# Patient Record
Sex: MALE | Employment: FULL TIME | URBAN - METROPOLITAN AREA
[De-identification: names, ages, dates, MRNs, and addresses within clinical notes are randomized per-mention and may not be internally consistent; named-entity substitution may affect disease eponyms.]

---

## 2019-11-20 ENCOUNTER — OFFICE VISIT (OUTPATIENT)
Dept: FAMILY MEDICINE | Facility: CLINIC | Age: 39
End: 2019-11-20
Payer: COMMERCIAL

## 2019-11-20 VITALS
SYSTOLIC BLOOD PRESSURE: 131 MMHG | BODY MASS INDEX: 27.97 KG/M2 | TEMPERATURE: 97.5 F | WEIGHT: 174 LBS | HEART RATE: 62 BPM | OXYGEN SATURATION: 98 % | DIASTOLIC BLOOD PRESSURE: 84 MMHG | HEIGHT: 66 IN

## 2019-11-20 DIAGNOSIS — R31.0 GROSS HEMATURIA: Primary | ICD-10-CM

## 2019-11-20 LAB
ALBUMIN UR-MCNC: NEGATIVE MG/DL
APPEARANCE UR: CLEAR
BACTERIA #/AREA URNS HPF: ABNORMAL /HPF
BILIRUB UR QL STRIP: NEGATIVE
COLOR UR AUTO: YELLOW
GLUCOSE UR STRIP-MCNC: NEGATIVE MG/DL
HGB UR QL STRIP: NEGATIVE
KETONES UR STRIP-MCNC: NEGATIVE MG/DL
LEUKOCYTE ESTERASE UR QL STRIP: NEGATIVE
NITRATE UR QL: NEGATIVE
NON-SQ EPI CELLS #/AREA URNS LPF: ABNORMAL /LPF
PH UR STRIP: 7 PH (ref 5–7)
RBC #/AREA URNS AUTO: ABNORMAL /HPF
SOURCE: ABNORMAL
SP GR UR STRIP: 1.01 (ref 1–1.03)
UROBILINOGEN UR STRIP-ACNC: 0.2 EU/DL (ref 0.2–1)
WBC #/AREA URNS AUTO: ABNORMAL /HPF

## 2019-11-20 PROCEDURE — 00000103 ZZHCL STATISTIC CYTO-FISH QC 88120: Performed by: FAMILY MEDICINE

## 2019-11-20 PROCEDURE — 88112 CYTOPATH CELL ENHANCE TECH: CPT | Performed by: FAMILY MEDICINE

## 2019-11-20 PROCEDURE — 99203 OFFICE O/P NEW LOW 30 MIN: CPT | Performed by: FAMILY MEDICINE

## 2019-11-20 PROCEDURE — 81001 URINALYSIS AUTO W/SCOPE: CPT | Performed by: FAMILY MEDICINE

## 2019-11-20 RX ORDER — MELOXICAM 7.5 MG/1
7.5 TABLET ORAL
COMMUNITY
Start: 2019-11-05

## 2019-11-20 ASSESSMENT — PAIN SCALES - GENERAL: PAINLEVEL: NO PAIN (0)

## 2019-11-20 ASSESSMENT — MIFFLIN-ST. JEOR: SCORE: 1652.01

## 2019-11-20 NOTE — PATIENT INSTRUCTIONS
At [facility], we strive to deliver an exceptional experience to you, every time we see you. If you receive a survey, please complete it as we do value your feedback.  If you have MyChart, you can expect to receive results automatically within 24 hours of their completion.  Your provider will send a note interpreting your results as well.   If you do not have MyChart, you should receive your results in about a week by mail.    Your care team:                            Family Medicine Internal Medicine   MD Mckinley Simon MD Shantel Branch-Fleming, MD Katya Georgiev PA-C Megan Hill, APRMILLA Linda, MD Pediatrics   Juan A Serrano, PANnekaC  Amber Jacques, MD Estela Marx APRN CNP   MD Desi Hopper MD Deborah Mielke, MD Kim Thein, APRN CNP      Clinic hours: Monday - Thursday 7 am-7 pm; Fridays 7 am-5 pm.   Urgent care: Monday - Friday 11 am-9 pm; Saturday and Sunday 9 am-5 pm.  Pharmacy : Monday -Thursday 8 am-8 pm; Friday 8 am-6 pm; Saturday and Sunday 9 am-5 pm.     Clinic: [phone]   Pharmacy: [phone]

## 2019-11-20 NOTE — PROGRESS NOTES
"Subjective     Filiberto Ferrari is a 39 year old male who presents to clinic today for the following health issues:    HPI   Genitourinary - Male  Onset: about 2 months ago     Description:   Dysuria (painful urination): YES  Hematuria (blood in urine): YES  Frequency: no   Are you urinating at night : no   Hesitancy (delay in urine): no   Retention (unable to empty): no   Decrease in urinary flow: no   Incontinence: no     Progression of Symptoms:  worsening    Accompanying Signs & Symptoms:  Fever: no   Back/Flank pain: YES  Urethral discharge: no   Testicle lumps/masses/pain: Yes   Nausea and/or vomiting: no   Abdominal pain: no     History:   History of frequent UTI's: no   History of kidney stones: no   History of hernias: no   Personal or Family history of Prostate problems: no  Sexually active: YES    Precipitating factors:   none    Alleviating factors:  none      There is no problem list on file for this patient.    No past surgical history on file.    Social History     Tobacco Use     Smoking status: Never Smoker     Smokeless tobacco: Never Used   Substance Use Topics     Alcohol use: Yes     History reviewed. No pertinent family history.        Reviewed and updated as needed this visit by Provider         Review of Systems   ROS COMP: Constitutional, HEENT, cardiovascular, pulmonary, GI, , musculoskeletal, neuro, skin, endocrine and psych systems are negative, except as otherwise noted.      Objective    /84 (BP Location: Left arm, Patient Position: Chair, Cuff Size: Adult Regular)   Pulse 62   Temp 97.5  F (36.4  C) (Oral)   Ht 1.676 m (5' 6\")   Wt 78.9 kg (174 lb)   SpO2 98%   BMI 28.08 kg/m    Body mass index is 28.08 kg/m .  Physical Exam   GENERAL: healthy, alert and no distress  NECK: no adenopathy, no asymmetry, masses, or scars and thyroid normal to palpation  RESP: lungs clear to auscultation - no rales, rhonchi or wheezes  CV: regular rate and rhythm, normal S1 S2, no S3 or S4, " "no murmur, click or rub, no peripheral edema and peripheral pulses strong  ABDOMEN: soft, nontender, no hepatosplenomegaly, no masses and bowel sounds normal  MS: no gross musculoskeletal defects noted, no edema    Diagnostic Test Results:  Labs reviewed in Epic        Assessment & Plan     1. Gross hematuria  Patient stated this occurred two times in the last 2 months and associates after intercourse. UA negative. Today. Urine cytology pending. Patient did recently have a CT abdomen and pelvis which showed no concerning findings. Patient referred to Urology for further evaluation and recommendations. Patient very anxious to find the underlying cause of the hematuria.   - UA with Microscopic reflex to Culture; Future  - Cytology non gyn; Future  - UA with Microscopic reflex to Culture  - Cytology non gyn  - UROLOGY ADULT REFERRAL     BMI:   Estimated body mass index is 28.08 kg/m  as calculated from the following:    Height as of this encounter: 1.676 m (5' 6\").    Weight as of this encounter: 78.9 kg (174 lb).           Work on weight loss  Regular exercise  See Patient Instructions    No follow-ups on file.    Hakeem Linda MD, MD  [facility]      "

## 2019-11-30 ENCOUNTER — TELEPHONE (OUTPATIENT)
Dept: FAMILY MEDICINE | Facility: CLINIC | Age: 39
End: 2019-11-30

## 2019-11-30 DIAGNOSIS — R68.89 FLU-LIKE SYMPTOMS: Primary | ICD-10-CM

## 2019-11-30 RX ORDER — OSELTAMIVIR PHOSPHATE 75 MG/1
75 CAPSULE ORAL 2 TIMES DAILY
Qty: 10 CAPSULE | Refills: 0 | Status: SHIPPED | OUTPATIENT
Start: 2019-11-30 | End: 2020-01-02

## 2019-11-30 NOTE — TELEPHONE ENCOUNTER
Patient called today c/o with fever of 103, body aches, runny nose, cough. These symptoms started 2 days ago. Likely the flu. Tamiflu sent for treatment. If worsens with possible complications, patient will let us know.    Hakeem Linda MD

## 2019-12-02 LAB — COPATH REPORT: NORMAL

## 2019-12-03 LAB — COPATH REPORT: NORMAL

## 2020-01-02 DIAGNOSIS — R68.89 FLU-LIKE SYMPTOMS: ICD-10-CM

## 2020-01-02 RX ORDER — OSELTAMIVIR PHOSPHATE 75 MG/1
75 CAPSULE ORAL 2 TIMES DAILY
Qty: 10 CAPSULE | Refills: 0 | Status: SHIPPED | OUTPATIENT
Start: 2020-01-02 | End: 2020-01-07

## 2020-07-16 ENCOUNTER — TELEPHONE (OUTPATIENT)
Dept: FAMILY MEDICINE | Facility: CLINIC | Age: 40
End: 2020-07-16

## 2020-07-16 DIAGNOSIS — T30.0 SUPERFICIAL BURN: Primary | ICD-10-CM

## 2020-07-16 RX ORDER — MUPIROCIN 20 MG/G
OINTMENT TOPICAL 3 TIMES DAILY
Qty: 60 G | Refills: 1 | Status: SHIPPED | OUTPATIENT
Start: 2020-07-16 | End: 2020-10-21
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
## 2020-10-21 ENCOUNTER — OFFICE VISIT (OUTPATIENT)
Dept: FAMILY MEDICINE | Facility: CLINIC | Age: 40
End: 2020-10-21
Payer: COMMERCIAL

## 2020-10-21 VITALS
HEART RATE: 76 BPM | WEIGHT: 177 LBS | SYSTOLIC BLOOD PRESSURE: 120 MMHG | DIASTOLIC BLOOD PRESSURE: 77 MMHG | OXYGEN SATURATION: 96 % | BODY MASS INDEX: 28.57 KG/M2

## 2020-10-21 DIAGNOSIS — R10.32 ABDOMINAL PAIN, LEFT LOWER QUADRANT: Primary | ICD-10-CM

## 2020-10-21 DIAGNOSIS — Z12.5 SCREENING FOR PROSTATE CANCER: ICD-10-CM

## 2020-10-21 DIAGNOSIS — R63.5 WEIGHT GAIN: ICD-10-CM

## 2020-10-21 DIAGNOSIS — R31.0 GROSS HEMATURIA: ICD-10-CM

## 2020-10-21 LAB
ALBUMIN UR-MCNC: NEGATIVE MG/DL
APPEARANCE UR: CLEAR
BILIRUB UR QL STRIP: NEGATIVE
COLOR UR AUTO: YELLOW
GLUCOSE UR STRIP-MCNC: NEGATIVE MG/DL
HGB UR QL STRIP: NEGATIVE
KETONES UR STRIP-MCNC: NEGATIVE MG/DL
LEUKOCYTE ESTERASE UR QL STRIP: NEGATIVE
MUCOUS THREADS #/AREA URNS LPF: PRESENT /LPF
NITRATE UR QL: NEGATIVE
PH UR STRIP: 6.5 PH (ref 5–7)
PSA SERPL-ACNC: 1.39 UG/L (ref 0–4)
RBC #/AREA URNS AUTO: ABNORMAL /HPF
SOURCE: ABNORMAL
SP GR UR STRIP: 1.02 (ref 1–1.03)
TSH SERPL DL<=0.005 MIU/L-ACNC: 2.26 MU/L (ref 0.4–4)
UROBILINOGEN UR STRIP-ACNC: 0.2 EU/DL (ref 0.2–1)
WBC #/AREA URNS AUTO: ABNORMAL /HPF

## 2020-10-21 PROCEDURE — G0103 PSA SCREENING: HCPCS | Performed by: FAMILY MEDICINE

## 2020-10-21 PROCEDURE — 84443 ASSAY THYROID STIM HORMONE: CPT | Performed by: FAMILY MEDICINE

## 2020-10-21 PROCEDURE — 99214 OFFICE O/P EST MOD 30 MIN: CPT | Performed by: FAMILY MEDICINE

## 2020-10-21 NOTE — LETTER
October 21, 2020      Filiberto Ferrari  [address]  [place] [address]        Dear ,    Your urine test did not show any blood. Your prostate cancer screening tests was negative. You do not have prostate cancer. Your thyroid test was normal.     Sincerely,   Hakeem Linda MD     Results for orders placed or performed in visit on 10/21/20   PSA, screen     Status: None   Result Value Ref Range    PSA 1.39 0 - 4 ug/L   UA with Microscopic reflex to Culture     Status: Abnormal    Specimen: Midstream Urine   Result Value Ref Range    Color Urine Yellow     Appearance Urine Clear     Glucose Urine Negative NEG^Negative mg/dL    Bilirubin Urine Negative NEG^Negative    Ketones Urine Negative NEG^Negative mg/dL    Specific Gravity Urine 1.025 1.003 - 1.035    pH Urine 6.5 5.0 - 7.0 pH    Protein Albumin Urine Negative NEG^Negative mg/dL    Urobilinogen Urine 0.2 0.2 - 1.0 EU/dL    Nitrite Urine Negative NEG^Negative    Blood Urine Negative NEG^Negative    Leukocyte Esterase Urine Negative NEG^Negative    Source Midstream Urine     WBC Urine 0 - 5 OTO5^0 - 5 /HPF    RBC Urine O - 2 OTO2^O - 2 /HPF    Mucous Urine Present (A) NEG^Negative /LPF   TSH with free T4 reflex     Status: None   Result Value Ref Range    TSH 2.26 0.40 - 4.00 mU/L

## 2020-10-21 NOTE — PROGRESS NOTES
Subjective     Filiberto Ferrari is a 40 year old male who presents to clinic today for the following health issues:    HPI            Patient presents to discuss Stomach problems.Patient has also noticed some weight gain in the last couple months.     Stomach   Onset/Duration: 1.5 years on and off     Description:Over a year in a half, pt has history of peeing blood for about 1 week. Patient also has lower back pain/pinches in back, only has these aches and pains after bm or when pt hasn't eaten in a while. Patient now only has bm twice a day. No family history of colon cancer but pt would like tests run for cancer.  Progression of Symptoms: intermittent  History   Past tests done: Back in 11/20/2020 pt had lab tests done that he would like to discuss   Precipitating or alleviating factors: None  Therapies tried and outcome: None       Review of Systems   Constitutional, HEENT, cardiovascular, pulmonary, GI, , musculoskeletal, neuro, skin, endocrine and psych systems are negative, except as otherwise noted.      Objective    /77 (BP Location: Left arm, Patient Position: Chair, Cuff Size: Adult Large)   Pulse 76   Wt 80.3 kg (177 lb)   SpO2 96%   BMI 28.57 kg/m    Body mass index is 28.57 kg/m .  Physical Exam   GENERAL: healthy, alert and no distress  NECK: no adenopathy, no asymmetry, masses, or scars and thyroid normal to palpation  RESP: lungs clear to auscultation - no rales, rhonchi or wheezes  CV: regular rate and rhythm, normal S1 S2, no S3 or S4, no murmur, click or rub, no peripheral edema and peripheral pulses strong  ABDOMEN: soft, LLQ tenderness, no r/r/g, no hepatosplenomegaly, no masses and bowel sounds normal  MS: no gross musculoskeletal defects noted, no edema          Assessment & Plan     Abdominal pain, left lower quadrant  Recent CT scan reviewed and showed concerning findings. Colonoscopy ordered for further investigation. Diffs: diverticulosis/diverticulitis, colitis, colon  "cancer  - GASTROENTEROLOGY ADULT REF PROCEDURE ONLY; Future    Gross hematuria  Intermittent per patient. Referred to Urology for further evaluation and recommendations.  - UROLOGY ADULT REFERRAL; Future  - UA with Microscopic reflex to Culture    Screening for prostate cancer    - PSA, screen    Weight gain    - TSH with free T4 reflex     BMI:   Estimated body mass index is 28.57 kg/m  as calculated from the following:    Height as of 11/20/19: 1.676 m (5' 6\").    Weight as of this encounter: 80.3 kg (177 lb).            Work on weight loss  Regular exercise  See Patient Instructions    Return in about 6 months (around 4/21/2021) for Physical Exam.    Hakeem Linda MD, MD  [facility]    "